# Patient Record
Sex: MALE | Race: WHITE | NOT HISPANIC OR LATINO | Employment: FULL TIME | URBAN - NONMETROPOLITAN AREA
[De-identification: names, ages, dates, MRNs, and addresses within clinical notes are randomized per-mention and may not be internally consistent; named-entity substitution may affect disease eponyms.]

---
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
## 2022-08-26 ENCOUNTER — NON-PROVIDER VISIT (OUTPATIENT)
Dept: URGENT CARE | Facility: PHYSICIAN GROUP | Age: 22
End: 2022-08-26

## 2022-08-26 DIAGNOSIS — Z02.1 PRE-EMPLOYMENT DRUG SCREENING: ICD-10-CM

## 2022-08-26 LAB
AMP AMPHETAMINE: NORMAL
BAR BARBITURATES: NORMAL
BZO BENZODIAZEPINES: NORMAL
COC COCAINE: NORMAL
INT CON NEG: NORMAL
INT CON POS: NORMAL
MDMA ECSTASY: NORMAL
MET METHAMPHETAMINES: NORMAL
MTD METHADONE: NORMAL
OPI OPIATES: NORMAL
OXY OXYCODONE: NORMAL
PCP PHENCYCLIDINE: NORMAL
POC URINE DRUG SCREEN OCDRS: NEGATIVE
THC: POSITIVE

## 2022-08-26 PROCEDURE — 80305 DRUG TEST PRSMV DIR OPT OBS: CPT | Performed by: PHYSICIAN ASSISTANT

## 2022-09-06 ENCOUNTER — EH NON-PROVIDER (OUTPATIENT)
Dept: OCCUPATIONAL MEDICINE | Facility: CLINIC | Age: 22
End: 2022-09-06

## 2022-09-06 DIAGNOSIS — Z02.83 ENCOUNTER FOR DRUG SCREENING: ICD-10-CM

## 2022-09-06 PROCEDURE — 8907 PR URINE COLLECT ONLY: Performed by: NURSE PRACTITIONER

## 2022-11-11 ENCOUNTER — OFFICE VISIT (OUTPATIENT)
Dept: URGENT CARE | Facility: PHYSICIAN GROUP | Age: 22
End: 2022-11-11
Payer: COMMERCIAL

## 2022-11-11 VITALS
TEMPERATURE: 97.9 F | BODY MASS INDEX: 24.36 KG/M2 | SYSTOLIC BLOOD PRESSURE: 120 MMHG | RESPIRATION RATE: 20 BRPM | WEIGHT: 189.8 LBS | HEIGHT: 74 IN | HEART RATE: 86 BPM | DIASTOLIC BLOOD PRESSURE: 82 MMHG | OXYGEN SATURATION: 97 %

## 2022-11-11 DIAGNOSIS — J06.9 VIRAL URI WITH COUGH: ICD-10-CM

## 2022-11-11 LAB
EXTERNAL QUALITY CONTROL: NORMAL
INT CON NEG: NORMAL
INT CON POS: NORMAL
SARS-COV+SARS-COV-2 AG RESP QL IA.RAPID: NEGATIVE

## 2022-11-11 PROCEDURE — 87426 SARSCOV CORONAVIRUS AG IA: CPT | Performed by: PHYSICIAN ASSISTANT

## 2022-11-11 PROCEDURE — 99213 OFFICE O/P EST LOW 20 MIN: CPT | Performed by: PHYSICIAN ASSISTANT

## 2022-11-11 ASSESSMENT — ENCOUNTER SYMPTOMS
SHORTNESS OF BREATH: 0
EYE DISCHARGE: 0
VOMITING: 1
SORE THROAT: 0
DIARRHEA: 0
FEVER: 1
COUGH: 1
EYE REDNESS: 0
MYALGIAS: 0
HEADACHES: 0

## 2022-11-11 NOTE — PROGRESS NOTES
Subjective     Jude Woodard is a 21 y.o. male who presents with Fatigue, Other (Pt states he's having a hard time tasting food and smell), and Cough (X 3 of a slight cough)        URI   This is a new problem. Episode onset: x 3-4 days ago. The problem has been unchanged. Maximum temperature: The patient reports a subjective fever at the onset of symptoms. Associated symptoms include congestion, coughing and vomiting (The patient reports intermittent vomiting at the onset of symptoms, now improved.). Pertinent negatives include no diarrhea, ear pain, headaches or sore throat. Treatments tried: OTC DayQuil and NyQuil.     The patient reports no recent sick contacts.  No known exposure to COVID-19.  The patient states his employer is requesting he be tested for COVID-19 given his current symptoms.    PMH:  has no past medical history on file.  MEDS:   Current Outpatient Medications:     erythromycin 5 MG/GM Ointment, Apply 1/2 inch to affected eye(s) every 6 hours until pain-free 12-hour, Disp: 1 Tube, Rfl: 0  ALLERGIES: No Known Allergies  SURGHX: No past surgical history on file.  SOCHX:  reports that he has never smoked. He has never used smokeless tobacco. He reports that he does not drink alcohol and does not use drugs.  FH: Family history was reviewed, no pertinent findings to report    Review of Systems   Constitutional:  Positive for fever (The patient reports a subjective fever at the onset of symptoms, now improved).   HENT:  Positive for congestion. Negative for ear pain and sore throat.    Eyes:  Negative for discharge and redness.   Respiratory:  Positive for cough. Negative for shortness of breath.    Gastrointestinal:  Positive for vomiting (The patient reports intermittent vomiting at the onset of symptoms, now improved.). Negative for diarrhea.   Musculoskeletal:  Negative for myalgias.   Neurological:  Negative for headaches.            Objective     /82   Pulse 86   Temp 36.6 °C  "(97.9 °F) (Temporal)   Resp 20   Ht 1.88 m (6' 2\")   Wt 86.1 kg (189 lb 12.8 oz) Comment: pt wearing steeltoe boots  SpO2 97%   BMI 24.37 kg/m²      Physical Exam  Constitutional:       General: He is not in acute distress.     Appearance: Normal appearance. He is not ill-appearing.   HENT:      Head: Normocephalic and atraumatic.      Right Ear: External ear normal.      Left Ear: External ear normal.      Nose: Nose normal.      Mouth/Throat:      Mouth: Mucous membranes are moist.      Pharynx: Oropharynx is clear. No posterior oropharyngeal erythema.   Eyes:      Extraocular Movements: Extraocular movements intact.      Conjunctiva/sclera: Conjunctivae normal.   Cardiovascular:      Rate and Rhythm: Normal rate and regular rhythm.      Heart sounds: Normal heart sounds.   Pulmonary:      Effort: Pulmonary effort is normal. No respiratory distress.      Breath sounds: Normal breath sounds. No wheezing.   Musculoskeletal:         General: Normal range of motion.      Cervical back: Normal range of motion and neck supple.   Skin:     General: Skin is warm and dry.   Neurological:      Mental Status: He is alert and oriented to person, place, and time.             Progress:  POCT Rapid COVID-19: NEGATIVE     The patient declined COVID-19 PCR testing at this time.                Assessment & Plan          1. Viral URI with cough  - POCT SARS-COV Antigen ELOY (Symptomatic only)    The patient's presenting symptoms and physical exam findings are consistent with a viral URI with associated cough.  The patient's physical exam today in clinic was normal.  The patient is nontoxic and appears in no acute distress.  The patient's vital signs are stable and within normal limits.  He is afebrile today in clinic.  Discussed likely viral etiology with the patient.  The patient's POCT rapid COVID-19 testing in clinic was negative.  The patient declined COVID-19 PCR testing at this time.  Advised the patient to monitor for " worsening signs and or symptoms.  Recommend OTC medications and supportive care for symptomatic management.  Recommend patient follow-up with his PCP as needed.  Discussed return precautions with the patient, and he verbalized understanding.    Differential diagnoses, supportive care, and indications for immediate follow-up discussed with patient.   Instructed to return to clinic or nearest emergency department for any change in condition, further concerns, or worsening of symptoms.    OTC Tylenol or Motrin for fever/discomfort.  OTC cough/cold medication for symptomatic relief  OTC Supportive Care for Congestion - saline nasal spray or neti pot  Drink plenty of fluids  Follow-up with PCP  Return to clinic or go to the ED if symptoms worsen or fail to improve, or if the patient should develop worsening/increasing cough, congestion, ear pain, sore throat, shortness of breath, wheezing, chest pain, fever/chills, and/or any concerning symptoms.    Discussed plan with the patient, and she agrees to the above.     I personally reviewed prior external notes and test results pertinent to today's visit.  I have independently reviewed and interpreted all diagnostics ordered during this urgent care visit.     Please note that this dictation was created using voice recognition software. I have made every reasonable attempt to correct obvious errors, but I expect that there may be errors of grammar and possibly content that I did not discover before finalizing the note.       This note was electronically signed by Debbie Godinez PA-C